# Patient Record
Sex: FEMALE | URBAN - METROPOLITAN AREA
[De-identification: names, ages, dates, MRNs, and addresses within clinical notes are randomized per-mention and may not be internally consistent; named-entity substitution may affect disease eponyms.]

---

## 2017-07-30 ENCOUNTER — APPOINTMENT (OUTPATIENT)
Dept: GENERAL RADIOLOGY | Facility: CLINIC | Age: 47
End: 2017-07-30
Attending: EMERGENCY MEDICINE
Payer: COMMERCIAL

## 2017-07-30 ENCOUNTER — HOSPITAL ENCOUNTER (EMERGENCY)
Facility: CLINIC | Age: 47
Discharge: HOME | End: 2017-07-30
Attending: EMERGENCY MEDICINE | Admitting: EMERGENCY MEDICINE
Payer: COMMERCIAL

## 2017-07-30 VITALS
OXYGEN SATURATION: 100 % | DIASTOLIC BLOOD PRESSURE: 69 MMHG | TEMPERATURE: 97.9 F | RESPIRATION RATE: 11 BRPM | BODY MASS INDEX: 24.84 KG/M2 | SYSTOLIC BLOOD PRESSURE: 111 MMHG | WEIGHT: 145.5 LBS | HEIGHT: 64 IN | HEART RATE: 52 BPM

## 2017-07-30 DIAGNOSIS — S43.004A DISLOCATION OF SHOULDER REGION, RIGHT, INITIAL ENCOUNTER: ICD-10-CM

## 2017-07-30 PROCEDURE — 99285 EMERGENCY DEPT VISIT HI MDM: CPT | Mod: 25

## 2017-07-30 PROCEDURE — 73030 X-RAY EXAM OF SHOULDER: CPT | Mod: RT

## 2017-07-30 PROCEDURE — 25000128 H RX IP 250 OP 636: Performed by: EMERGENCY MEDICINE

## 2017-07-30 PROCEDURE — 25000128 H RX IP 250 OP 636

## 2017-07-30 PROCEDURE — 23650 CLTX SHO DSLC W/MNPJ WO ANES: CPT | Mod: RT

## 2017-07-30 PROCEDURE — 96376 TX/PRO/DX INJ SAME DRUG ADON: CPT

## 2017-07-30 PROCEDURE — 99152 MOD SED SAME PHYS/QHP 5/>YRS: CPT

## 2017-07-30 PROCEDURE — 96374 THER/PROPH/DIAG INJ IV PUSH: CPT | Mod: 59

## 2017-07-30 RX ORDER — HYDROMORPHONE HYDROCHLORIDE 1 MG/ML
0.5 INJECTION, SOLUTION INTRAMUSCULAR; INTRAVENOUS; SUBCUTANEOUS ONCE
Status: COMPLETED | OUTPATIENT
Start: 2017-07-30 | End: 2017-07-30

## 2017-07-30 RX ORDER — HYDROMORPHONE HYDROCHLORIDE 1 MG/ML
INJECTION, SOLUTION INTRAMUSCULAR; INTRAVENOUS; SUBCUTANEOUS
Status: DISCONTINUED
Start: 2017-07-30 | End: 2017-07-30 | Stop reason: HOSPADM

## 2017-07-30 RX ORDER — HYDROMORPHONE HYDROCHLORIDE 1 MG/ML
0.5 INJECTION, SOLUTION INTRAMUSCULAR; INTRAVENOUS; SUBCUTANEOUS
Status: COMPLETED | OUTPATIENT
Start: 2017-07-30 | End: 2017-07-30

## 2017-07-30 RX ORDER — PROPOFOL 10 MG/ML
INJECTION, EMULSION INTRAVENOUS
Status: DISCONTINUED
Start: 2017-07-30 | End: 2017-07-30 | Stop reason: HOSPADM

## 2017-07-30 RX ADMIN — HYDROMORPHONE HYDROCHLORIDE 0.5 MG: 1 INJECTION, SOLUTION INTRAMUSCULAR; INTRAVENOUS; SUBCUTANEOUS at 08:26

## 2017-07-30 RX ADMIN — HYDROMORPHONE HYDROCHLORIDE 0.5 MG: 1 INJECTION, SOLUTION INTRAMUSCULAR; INTRAVENOUS; SUBCUTANEOUS at 07:20

## 2017-07-30 ASSESSMENT — ENCOUNTER SYMPTOMS
WEAKNESS: 0
NUMBNESS: 0
WOUND: 0
ARTHRALGIAS: 1

## 2017-07-30 NOTE — ED NOTES
Bed: ED03  Expected date: 7/30/17  Expected time:   Means of arrival:   Comments:  mateo 2 46 f/shoulder pain

## 2017-07-30 NOTE — ED AVS SNAPSHOT
Emergency Department    [address] [place] [address]    Phone:  [phone]    Fax:  [phone]                                       Graciela Martin   MRN: [id number]    Department:   Emergency Department   Date of Visit:  7/30/2017           After Visit Summary Signature Page     I have received my discharge instructions, and my questions have been answered. I have discussed any challenges I see with this plan with the nurse or doctor.    ..........................................................................................................................................  Patient/Patient Representative Signature      ..........................................................................................................................................  Patient Representative Print Name and Relationship to Patient    ..................................................               ................................................  Date                                            Time    ..........................................................................................................................................  Reviewed by Signature/Title    ...................................................              ..............................................  Date                                                            Time

## 2017-07-30 NOTE — DISCHARGE INSTRUCTIONS
Dislocation: Shoulder (Reduced)    A shoulder is dislocated when a strong force injures, and possibly tears the ligaments that hold the shoulder joint together. The bones that make up the joint then move apart and become stuck out of place. The joint must be put back in place. Then it will take several weeks for the shoulder to heal. This injury may weaken the ligaments. Weakened ligaments put you at risk for another dislocation. Another dislocation can happen even if you are hit with less force.  Shoulder dislocation is treated with a special type of arm sling called a shoulder immobilizer. This keeps your arm close to your body. This stops the shoulder from dislocating again while the ligaments heal. After a few weeks, you may start an exercise program. This will gradually bring back your range-of-motion and shoulder strength. It will also lower your risk for another dislocation.  Home care  Follow these tips for taking care of yourself at home:    Until your next doctor visit, wear your shoulder immobilizer at all times. Don t take it off at night to sleep. This is because it s possible to dislocate your arm again in your sleep. You can take it off to bathe or dress. But don t move your arm away from your body. Keep your arm in the same position that the sling was holding it in until you put the sling back on. During your next visit, ask your doctor how long you should wear the sling.    Apply an ice pack over the injured area for 20 minutes every 1 to 2 hours the first day. You can make an ice pack by putting ice cubes in a plastic bag. Wrap the bag in a towel before putting it on your shoulder. Continue with ice packs 3 to 4 times a day for the next 2 to 3 days. Then use the ice as needed to relieve pain and swelling.    You may use acetaminophen or ibuprofen to control pain, unless another pain medicine was prescribed. If you have chronic liver or kidney disease or ever had a stomach ulcer or  gastrointestinal bleeding, talk with your doctor before using these medicines.    Don t take part in sports or physical education classes until your doctor says it s OK.  Follow-up care  Follow up with your healthcare provider, or as advised. You shouldn t wear your shoulder immobilizer or sling for more than a few weeks without taking it off. Keeping it on for a longer time may limit your range-of-motion at the shoulder joint. If you have had several dislocations of the same shoulder, you may have permanent damage to the ligaments. Ask an orthopedic doctor about surgery to prevent another dislocation.  When to seek medical advice  Call your healthcare provider right away if any of these occur:    Another dislocation of your shoulder    Swelling or pain in the shoulder or arm that gets worse    Your fingers become cold, blue, numb or tingly    Fever or chills  Date Last Reviewed: 8/2/2016 2000-2017 [employer]. [address] [place] [address]. All rights reserved. This information is not intended as a substitute for professional medical care. Always follow your healthcare professional's instructions.

## 2017-07-30 NOTE — ED AVS SNAPSHOT
Emergency Department    [address] [facility] [address]    Phone:  [phone]    Fax:  [phone]                                       Graciela Martin   MRN: [id number]    Department:   Emergency Department   Date of Visit:  7/30/2017           Patient Information     Date Of Birth          1970        Your diagnoses for this visit were:     Dislocation of shoulder region, right, initial encounter        You were seen by Henny Herrera MD.      Follow-up Information     Follow up with Orthopaedics, [facility]. Schedule an appointment as soon as possible for a visit in 3 days.    Contact information:    [address] [address]  [phone]          Follow up with  Emergency Department.    Specialty:  EMERGENCY MEDICINE    Why:  As needed, If symptoms worsen    Contact information:    [address] [facility] [id number] [phone]        Discharge Instructions         Dislocation: Shoulder (Reduced)    A shoulder is dislocated when a strong force injures, and possibly tears the ligaments that hold the shoulder joint together. The bones that make up the joint then move apart and become stuck out of place. The joint must be put back in place. Then it will take several weeks for the shoulder to heal. This injury may weaken the ligaments. Weakened ligaments put you at risk for another dislocation. Another dislocation can happen even if you are hit with less force.  Shoulder dislocation is treated with a special type of arm sling called a shoulder immobilizer. This keeps your arm close to your body. This stops the shoulder from dislocating again while the ligaments heal. After a few weeks, you may start an exercise program. This will gradually bring back your range-of-motion and shoulder strength. It will also lower your risk for another dislocation.  Home care  Follow these tips for taking care of yourself at home:    Until your next doctor visit,  wear your shoulder immobilizer at all times. Don t take it off at night to sleep. This is because it s possible to dislocate your arm again in your sleep. You can take it off to bathe or dress. But don t move your arm away from your body. Keep your arm in the same position that the sling was holding it in until you put the sling back on. During your next visit, ask your doctor how long you should wear the sling.    Apply an ice pack over the injured area for 20 minutes every 1 to 2 hours the first day. You can make an ice pack by putting ice cubes in a plastic bag. Wrap the bag in a towel before putting it on your shoulder. Continue with ice packs 3 to 4 times a day for the next 2 to 3 days. Then use the ice as needed to relieve pain and swelling.    You may use acetaminophen or ibuprofen to control pain, unless another pain medicine was prescribed. If you have chronic liver or kidney disease or ever had a stomach ulcer or gastrointestinal bleeding, talk with your doctor before using these medicines.    Don t take part in sports or physical education classes until your doctor says it s OK.  Follow-up care  Follow up with your healthcare provider, or as advised. You shouldn t wear your shoulder immobilizer or sling for more than a few weeks without taking it off. Keeping it on for a longer time may limit your range-of-motion at the shoulder joint. If you have had several dislocations of the same shoulder, you may have permanent damage to the ligaments. Ask an orthopedic doctor about surgery to prevent another dislocation.  When to seek medical advice  Call your healthcare provider right away if any of these occur:    Another dislocation of your shoulder    Swelling or pain in the shoulder or arm that gets worse    Your fingers become cold, blue, numb or tingly    Fever or chills  Date Last Reviewed: 8/2/2016 2000-2017 [employer]. [address] [place] [address], [place] [address]. All rights reserved. This  information is not intended as a substitute for professional medical care. Always follow your healthcare professional's instructions.          24 Hour Appointment Hotline       To make an appointment at any [facility], call [phone] ([phone]). If you don't have a family doctor or clinic, we will help you find one. [facility] are conveniently located to serve the needs of you and your family.             Review of your medicines      Notice     You have not been prescribed any medications.            Procedures and tests performed during your visit     XR Shoulder Right Port G/E 2 Views      Orders Needing Specimen Collection     None      Pending Results     No orders found from 7/28/2017 to 7/31/2017.            Pending Culture Results     No orders found from 7/28/2017 to 7/31/2017.            Pending Results Instructions     If you had any lab results that were not finalized at the time of your Discharge, you can call the ED Lab Result RN at [phone]. You will be contacted by this team for any positive Lab results or changes in treatment. The nurses are available 7 days a week from 10A to 6:30P.  You can leave a message 24 hours per day and they will return your call.        Test Results From Your Hospital Stay              7/30/2017  8:33 AM      Narrative     XR SHOULDER RT PORT G/E 2 VW   7/30/2017 8:26 AM     HISTORY: Pain.    COMPARISON: None.        Impression     IMPRESSION: Normal.    JENNIFFER HDZ MD                Clinical Quality Measure: Blood Pressure Screening     Your blood pressure was checked while you were in the emergency department today. The last reading we obtained was  BP: 102/56 . Please read the guidelines below about what these numbers mean and what you should do about them.  If your systolic blood pressure (the top number) is less than 120 and your diastolic blood pressure (the bottom number) is less than 80, then your blood pressure is normal. There is  "nothing more that you need to do about it.  If your systolic blood pressure (the top number) is 120-139 or your diastolic blood pressure (the bottom number) is 80-89, your blood pressure may be higher than it should be. You should have your blood pressure rechecked within a year by a primary care provider.  If your systolic blood pressure (the top number) is 140 or greater or your diastolic blood pressure (the bottom number) is 90 or greater, you may have high blood pressure. High blood pressure is treatable, but if left untreated over time it can put you at risk for heart attack, stroke, or kidney failure. You should have your blood pressure rechecked by a primary care provider within the next 4 weeks.  If your provider in the emergency department today gave you specific instructions to follow-up with your doctor or provider even sooner than that, you should follow that instruction and not wait for up to 4 weeks for your follow-up visit.        Thank you for choosing [facility]       Thank you for choosing [facility] for your care. Our goal is always to provide you with excellent care. Hearing back from our patients is one way we can continue to improve our services. Please take a few minutes to complete the written survey that you may receive in the mail after you visit with us. Thank you!        [employer] Information     [facility] lets you send messages to your doctor, view your test results, renew your prescriptions, schedule appointments and more. To sign up, go to [email or web address] . Click on \"Log in\" on the left side of the screen, which will take you to the Welcome page. Then click on \"Sign up Now\" on the right side of the page.     You will be asked to enter the access code listed below, as well as some personal information. Please follow the directions to create your username and password.     Your access code is: [email or web address]  Expires: 10/28/2017  9:57 AM     Your access code will  in 90 days. If you " need help or a new code, please call your [facility] clinic or [phone].        Care EveryWhere ID     This is your Care EveryWhere ID. This could be used by other organizations to access your [facility] medical records  [id number]        Equal Access to Services     YAS MALONE : Casey Choe, home boboadaha, qamickey kaalmaphong justice, malik welch. So [facility] [phone].    ATENCIÓN: Si habla español, tiene a salas disposición servicios gratuitos de asistencia lingüística. Llame al [phone].    We comply with applicable federal civil rights laws and [place] laws. We do not discriminate on the basis of race, color, national origin, age, disability sex, sexual orientation or gender identity.            After Visit Summary       This is your record. Keep this with you and show to your community pharmacist(s) and doctor(s) at your next visit.

## 2017-07-30 NOTE — ED PROVIDER NOTES
"  History     Chief Complaint:  Shoulder pain    HPI   Graciela Martin is a 46 year old female who presents with shoulder pain. The patient states she slipped on the spiraling steps all the way to the bottom on her back. She did not hit her head or have loss of consciousness. She thinks that she have dislocated her right shoulder as she have dislocated her right shoulder 4 times previously. Here, the patient is tearful, sitting in bed and holding her right shoulder with her left hand in pain, but denies numbness, weakness, or any additional injuries.     Allergies:  No known drug allergies     Medications:    The patient is currently on no regular medications.     Past Medical History:    The patient does not have any past pertinent medical history.     Past Surgical History:    History reviewed. No pertinent surgical history.     Family History:    History reviewed. No pertinent family history.      Social History:  Smoking status: Never smoker    Review of Systems   Musculoskeletal: Positive for arthralgias.   Skin: Negative for wound.   Neurological: Negative for syncope, weakness and numbness.   10 point review of systems performed and is negative except as above and in HPI.     Physical Exam   First Vitals:  BP: 105/53  Pulse: 63  Heart Rate: 63  Temp: 97.9  F (36.6  C)  Resp: 16  Height: 162.6 cm (5' 4\")  Weight: 66 kg (145 lb 8.1 oz)  SpO2: 99 %      Physical Exam  General: Resting on the gurney, appears very uncomfortable  Head:  The scalp, face, and head appear normal  Mouth/Throat: Mucus membranes are moist  CV:  Regular rate    Normal S1 and S2  No pathological murmur   Resp:  Breath sounds clear and equal bilaterally    Non-labored, no retractions or accessory muscle use    No coarseness    No wheezing   GI:  Abdomen is soft, no rigidity    No tenderness to palpation  MS:  Good capillary refill noted.    Right shoulder with obvious deformity consistent with anterior dislocation.   Skin:  No rash or " lesions noted.  Neuro:  Speech is normal and fluent. No apparent deficit. Sensation intact in the hand and fingers. The patient is able to give a thumb's up and ok sign and touch her thumb to pinky. She is able to flex and extend fingers and abduct and adduct fingers.  Brisk capillary refill in the hand.   Psych: Awake. Alert.  Normal affect.      Appropriate interactions.     Emergency Department Course   Imaging:  Radiographic findings were communicated with the patient who voiced understanding of the findings.    X-ray shoulder right, 2 views post reduction:  Normal  Result per radiology.      Procedures:      Procedure: Sedation       Expected Level:  Minimal Sedation      Indication:  Sedation is required to allow for joint reduction    Consent:  Risks (including but not limited to: respiratory depression, respiratory failure, or death), benefits and alternatives were discussed with    patient and consent for procedure was obtained.       Timeout: Universal protocol was followed.  TIME OUT conducted prior to     Starting procedure confirmed patient identity, site/side, procedure, patient    Position, and availability of correct equipment and implants?     Yes    PREASSESSMENT TIME: 0756      PO Intake:  Clear Liquids >2 hours      ASA Class:  Class 1 - HEALTHY PATIENT      Mallampati:  Grade 3:  Soft palate visible, posterior pharyngeal wall not visible      Lungs: Clear       Heart: Clear      Medication:  Propofol      Monitoring:  Monitoring consisted of:  heart rate, cardiac monitor, continuous pulse oximetry, continuous capnometry, frequent blood pressure checks, level of consciousness, IV access, constant attendance by RN until patient recovered, constant attendance by MD until patient stable and intubation and emergency airway equipment available      Patient tolerance: Patient tolerated the procedure well with no immediate complications, Vital signs stable, Airway patent, O2 Sats > 92%.      Patient  Status:  Post procedure patient was sleepy.   Patient was monitored during recovery and returned to pre-procedure baseline.    TOTAL PHYSICIAN DRUG ADMINISTRATION/MONITORING TIME: 3 minutes        Narrative: Procedure: Reduction       Location: Right shoulder     Consent:  Risks, benefits and alternatives were discussed with patient and consent for procedure was obtained.     Timeout:  Universal protocol was followed. TIME OUT conducted just prior to starting procedure confirmed patient identity, site/side, procedure, patient position, and availability of correct equipment and implants? Yes      Medication:  Propofol: IV    Procedure Note:  Patient positioned in supine position.  Procedural anesthesia was utilized, see above.  The arm was grasped and with gentle longitudinal traction with scapular manipulation the humeral head was easily reduced back into the glenoid fossa.  The neurovascular exam was normal before and after reduction attempt.      Patient Status:  Patient tolerated the procedure well.  There were no complications.       Interventions:  0720 Dilaudid 0.5 mg IV    0759 Propofol 40 mg IV   0801 Propofol 20 mg IV   0826 Dilaudid 0.5mg IV    Emergency Department Course:  Past medical records, nursing notes, and vitals reviewed.  0728: I performed an exam of the patient and obtained history, as documented above.   Intervention given as above.  Procedure performed as above.   The patient was sent for an X-ray while in the emergency department, findings above.   0853: I rechecked the patient. Findings and plan explained to the Patient. Patient discharged home with instructions regarding supportive care, medications, and reasons to return. The importance of close follow-up was reviewed.      Impression & Plan    Medical Decision Making:  Graciela Martin is a 46 year old female who presents for evaluation of shoulder pain after falling down spiraling stairs on her back. This is consistent by clinical and  radiological exam with a shoulder dislocation. The dislocation was successfully reduced and a shoulder immobilizer was placed by myself, the fit was checked.  The neurovascular exam is normal pre and post-reduction.  I will have patient stay in the immobilizer until follow-up with orthopedics in 3-5 days.  The head to toe trauma exam is otherwise normal and I doubt serious underlying spinal, intracerebral, chest, abdominal, pelvic or extremity trauma.      Diagnosis:    ICD-10-CM    1. Dislocation of shoulder region, right, initial encounter S43.004A        Disposition:  discharged to home    Kyle Malagon  7/30/2017    EMERGENCY DEPARTMENT  I, Kyle Malagon, am serving as a scribe at 7:28 AM on 7/30/2017 to document services personally performed by Henny Herrera MD based on my observations and the provider's statements to me.       Henny Herrera MD  07/31/17 9048

## 2017-10-05 ENCOUNTER — HOSPITAL ENCOUNTER (OUTPATIENT)
Dept: GENERAL RADIOLOGY | Facility: CLINIC | Age: 47
Discharge: HOME | End: 2017-10-05

## 2017-10-05 DIAGNOSIS — R76.11 POSITIVE PPD: ICD-10-CM

## 2017-10-05 PROCEDURE — 40000293 XR CHEST 1 VIEW, EMPLOYEE HEALTH

## 2018-02-08 ENCOUNTER — OFFICE VISIT (OUTPATIENT)
Dept: FAMILY MEDICINE | Facility: CLINIC | Age: 48
End: 2018-02-08
Payer: COMMERCIAL

## 2018-02-08 VITALS — TEMPERATURE: 97.2 F | DIASTOLIC BLOOD PRESSURE: 60 MMHG | SYSTOLIC BLOOD PRESSURE: 95 MMHG

## 2018-02-08 DIAGNOSIS — Z71.84 TRAVEL ADVICE ENCOUNTER: Primary | ICD-10-CM

## 2018-02-08 DIAGNOSIS — Z23 NEED FOR VACCINATION: ICD-10-CM

## 2018-02-08 PROCEDURE — 90471 IMMUNIZATION ADMIN: CPT | Mod: GA | Performed by: NURSE PRACTITIONER

## 2018-02-08 PROCEDURE — 86480 TB TEST CELL IMMUN MEASURE: CPT | Mod: GA | Performed by: NURSE PRACTITIONER

## 2018-02-08 PROCEDURE — 90472 IMMUNIZATION ADMIN EACH ADD: CPT | Mod: GA | Performed by: NURSE PRACTITIONER

## 2018-02-08 PROCEDURE — 36415 COLL VENOUS BLD VENIPUNCTURE: CPT | Mod: GA | Performed by: NURSE PRACTITIONER

## 2018-02-08 PROCEDURE — 90717 YELLOW FEVER VACCINE SUBQ: CPT | Mod: GA | Performed by: NURSE PRACTITIONER

## 2018-02-08 PROCEDURE — 90632 HEPA VACCINE ADULT IM: CPT | Mod: GA | Performed by: NURSE PRACTITIONER

## 2018-02-08 PROCEDURE — 99403 PREV MED CNSL INDIV APPRX 45: CPT | Mod: 25 | Performed by: NURSE PRACTITIONER

## 2018-02-08 RX ORDER — ATOVAQUONE AND PROGUANIL HYDROCHLORIDE 250; 100 MG/1; MG/1
1 TABLET, FILM COATED ORAL DAILY
Qty: 17 TABLET | Refills: 0 | Status: SHIPPED | OUTPATIENT
Start: 2018-02-08

## 2018-02-08 RX ORDER — CIPROFLOXACIN 500 MG/1
500 TABLET, FILM COATED ORAL 2 TIMES DAILY
Qty: 6 TABLET | Refills: 0 | Status: SHIPPED | OUTPATIENT
Start: 2018-02-08

## 2018-02-08 RX ORDER — ESTRADIOL 0.03 MG/D
FILM, EXTENDED RELEASE TRANSDERMAL
Refills: 0 | COMMUNITY
Start: 2018-01-22

## 2018-02-08 RX ORDER — AZITHROMYCIN 500 MG/1
500 TABLET, FILM COATED ORAL DAILY
Qty: 3 TABLET | Refills: 0 | Status: SHIPPED | OUTPATIENT
Start: 2018-02-08 | End: 2018-02-11

## 2018-02-08 NOTE — PATIENT INSTRUCTIONS
Today February 8, 2018 you received the    Hepatitis A Vaccine - Please return on 8/7/18 or later for your 2nd and final dose.    Yellow Fever (YF)        Typhoid - Oral. This prescription has been faxed to your pharmacy, please take as directed.     FUTURE:  Tdap and  Meningitis  Menactra  .    These appointments can be made as a NURSE ONLY visit.    **It is very important for the vaccinations to be given on the scheduled day(s), this helps ensure you receive the full effectiveness of the vaccine.**    Please call [facility] with any questions [phone]    Thank you for visiting [facility]

## 2018-02-08 NOTE — MR AVS SNAPSHOT
After Visit Summary   2/8/2018    Graciela Martin    MRN: [id number]           Patient Information     Date Of Birth          1970        Visit Information        Provider Department      2/8/2018 10:15 AM Kavitha Barboza APRN CNP [facility]        Today's Diagnoses     Travel advice encounter    -  1    Need for vaccination          Care Instructions    Today February 8, 2018 you received the    Hepatitis A Vaccine - Please return on 8/7/18 or later for your 2nd and final dose.    Yellow Fever (YF)        Typhoid - Oral. This prescription has been faxed to your pharmacy, please take as directed.     FUTURE:  Tdap and  Meningitis  Menactra  .    These appointments can be made as a NURSE ONLY visit.    **It is very important for the vaccinations to be given on the scheduled day(s), this helps ensure you receive the full effectiveness of the vaccine.**    Please call [facility] with any questions [phone]    Thank you for visiting [facility]              Follow-ups after your visit        Future tests that were ordered for you today     Open Future Orders        Priority Expected Expires Ordered    TDAP VACCINE (ADACEL) Routine  2/8/2019 2/8/2018    MENINGOCOCCAL VACCINE,IM (MENACTRA) Routine  2/8/2019 2/8/2018            Who to contact     If you have questions or need follow up information about today's clinic visit or your schedule please contact [facility] directly at [phone].  Normal or non-critical lab and imaging results will be communicated to you by MyChart, letter or phone within 4 business days after the clinic has received the results. If you do not hear from us within 7 days, please contact the clinic through MyChart or phone. If you have a critical or abnormal lab result, we will notify you by phone as soon as possible.  Submit refill requests through [email or web address] or call your pharmacy and they will forward the  "refill request to us. Please allow 3 business days for your refill to be completed.          Additional Information About Your Visit        MyChart Information     [facility] lets you send messages to your doctor, view your test results, renew your prescriptions, schedule appointments and more. To sign up, go to [email or web address] . Click on \"Log in\" on the left side of the screen, which will take you to the Welcome page. Then click on \"Sign up Now\" on the right side of the page.     You will be asked to enter the access code listed below, as well as some personal information. Please follow the directions to create your username and password.     Your access code is: [email or web address]  Expires: 2018 11:11 AM     Your access code will  in 90 days. If you need help or a new code, please call your [facility] clinic or [phone].        Care EveryWhere ID     This is your Care EveryWhere ID. This could be used by other organizations to access your [facility] medical records  [id number]        Your Vitals Were     Temperature Last Period Breastfeeding?             97.2  F (36.2  C) (Oral) 2018 No          Blood Pressure from Last 3 Encounters:   18 95/60   17 111/69    Weight from Last 3 Encounters:   17 145 lb 8.1 oz (66 kg)              We Performed the Following     C YELLOW FEVER IMMUNIZATION, LIVE, SQ (STAMARIL)     HEPA VACCINE ADULT IM     M Tuberculosis by Quantiferon          Today's Medication Changes          These changes are accurate as of 18 11:17 AM.  If you have any questions, ask your nurse or doctor.               Start taking these medicines.        Dose/Directions    atovaquone-proguanil 250-100 MG per tablet   Commonly known as:  MALARONE   Used for:  Need for vaccination, Travel advice encounter   Started by:  Kavitha Barboza APRN CNP        Dose:  1 tablet   Take 1 tablet by mouth daily Start 2 days before exposure to Malaria and continue daily till  7 days " after exposure.   Quantity:  17 tablet   Refills:  0       azithromycin 500 MG tablet   Commonly known as:  ZITHROMAX   Used for:  Need for vaccination, Travel advice encounter   Started by:  Kavitha Barboza APRN CNP        Dose:  500 mg   Take 1 tablet (500 mg) by mouth daily for 3 doses Take 1 tablet a day for up to 3 days for severe diarrhea   Quantity:  3 tablet   Refills:  0       ciprofloxacin 500 MG tablet   Commonly known as:  CIPRO   Used for:  Travel advice encounter   Started by:  Kavitha Barboza APRN CNP        Dose:  500 mg   Take 1 tablet (500 mg) by mouth 2 times daily Take 1 tablet two times a day for up to 3 days for severe diarrhea during travel.   Quantity:  6 tablet   Refills:  0       typhoid CR capsule   Commonly known as:  VIVOTIF   Used for:  Need for vaccination, Travel advice encounter   Started by:  Kavitha Barboza APRN CNP        Dose:  1 capsule   Take 1 capsule by mouth every other day   Quantity:  4 capsule   Refills:  0            Where to get your medicines      These medications were sent to [facility] [address] [place] [address], [place] [address] [address] [facility]  [address] [place] [address]     Phone:  [phone]     atovaquone-proguanil 250-100 MG per tablet    azithromycin 500 MG tablet    ciprofloxacin 500 MG tablet    typhoid CR capsule                Primary Care Provider Fax #    Physician No Ref-Primary [phone]       No address on file        Equal Access to Services     [facility]: Hadii crystal tano Sosegundo, waaxda luqadaha, qaybta kaalmada adeegyada, malik quintana . So [facility] [phone].    ATENCIÓN: Si habla español, tiene a salas disposición servicios gratuitos de asistencia lingüística. Tabatha al [phone].    We comply with applicable federal civil rights laws and [place] laws. We do not discriminate on the basis of race, color, national origin, age, disability, sex, sexual orientation, or  gender identity.            Thank you!     Thank you for choosing [facility]  for your care. Our goal is always to provide you with excellent care. Hearing back from our patients is one way we can continue to improve our services. Please take a few minutes to complete the written survey that you may receive in the mail after your visit with us. Thank you!             Your Updated Medication List - Protect others around you: Learn how to safely use, store and throw away your medicines at www.disposemymeds.org.          This list is accurate as of 2/8/18 11:17 AM.  Always use your most recent med list.                   Brand Name Dispense Instructions for use Diagnosis    atovaquone-proguanil 250-100 MG per tablet    MALARONE    17 tablet    Take 1 tablet by mouth daily Start 2 days before exposure to Malaria and continue daily till  7 days after exposure.    Need for vaccination, Travel advice encounter       azithromycin 500 MG tablet    ZITHROMAX    3 tablet    Take 1 tablet (500 mg) by mouth daily for 3 doses Take 1 tablet a day for up to 3 days for severe diarrhea    Need for vaccination, Travel advice encounter       ciprofloxacin 500 MG tablet    CIPRO    6 tablet    Take 1 tablet (500 mg) by mouth 2 times daily Take 1 tablet two times a day for up to 3 days for severe diarrhea during travel.    Travel advice encounter       estradiol 0.025 MG/24HR BIW patch    VIVELLE-DOT     MATT 1 PA EXT TO THE SKIN 2 TIMES A WK        typhoid CR capsule    VIVOTIF    4 capsule    Take 1 capsule by mouth every other day    Need for vaccination, Travel advice encounter

## 2018-02-08 NOTE — PROGRESS NOTES
Nurse Note      Itinerary:  [facility]      Departure Date: 03/09/2018      Return Date: 03/06/2018      Length of Trip 1 week      Reason for Travel: Business           Urban or rural: Urban      Accommodations: Hotel        IMMUNIZATION HISTORY  Have you received any immunizations within the past 4 weeks?  No  Have you ever fainted from having your blood drawn or from an injection?  No  Have you ever had a fever reaction to vaccination?  No  Have you ever had any bad reaction or side effect from any vaccination?  No  Have you ever had hepatitis A or B vaccine?  Yes  Do you live (or work closely) with anyone who has AIDS, an AIDS-like condition, any other immune disorder or who is on chemotherapy for cancer?  No  Do you have a family history of immunodeficiency?  No  Have you received any injection of immune globulin or any blood products during the past 12 months?  No    Patient roomed by THOMAS Medina  Graciela Martin is a 47 year old female seen today alone for counsultation for international travel to [facility] for Business.  Patient will be departing in  1 month(s) and staying for   1 week(s) and  traveling with co-worker(s).      Patient itinerary :  will be in the urban region of [facility] which presents risk for Malaria, Yellow Fever, Dengue Fever, Chikungungya, Zika,  Trypanosomiasis, Schistosomiasis, Rabies, food borne illnesses, motor vehicle accidents, Typhoid and Leishmaniasis. exposure.      Patient's activities will include business.    Patient[place] birth [place]    Special medical concerns: none  Pre-travel questionnaire was completed by patient and reviewed by provider.     Vitals: BP 95/60  Temp 97.2  F (36.2  C) (Oral)  LMP 01/24/2018  Breastfeeding? No  BMI= There is no height or weight on file to calculate BMI.    EXAM:  General:  Well-nourished, well-developed in no acute distress.  Appears to be stated age, interacts appropriately and expresses understanding of  information given to patient.    Current Outpatient Prescriptions   Medication Sig Dispense Refill     estradiol (VIVELLE-DOT) 0.025 MG/24HR BIW patch MATT 1 PA EXT TO THE SKIN 2 TIMES A WK  0     typhoid (VIVOTIF) CR capsule Take 1 capsule by mouth every other day 4 capsule 0     atovaquone-proguanil (MALARONE) 250-100 MG per tablet Take 1 tablet by mouth daily Start 2 days before exposure to Malaria and continue daily till  7 days after exposure. 17 tablet 0     ciprofloxacin (CIPRO) 500 MG tablet Take 1 tablet (500 mg) by mouth 2 times daily Take 1 tablet two times a day for up to 3 days for severe diarrhea during travel. 6 tablet 0     There is no problem list on file for this patient.    No Known Allergies      Immunizations discussed include:   Hepatitis A:  Ordered/given today, risks, benefits and side effects reviewed  Hepatitis B: Up to date  Influenza: Up to date  Typhoid: Oral Typhoid (Vivotiff) Rx sent to pharmacy  Rabies: Declined  reviewed managment of a animal bite or scratch (washing wound, seek medical care within 24 hours for post exposure prophylaxis )  Yellow Fever: Stamaril Ordered/given today - consent completed, side effects, precautions, allergies, risks discussed. Patient expressed understanding.  Turkish Encephalitis: Not indicated  Meningococcus: future order placed  Tetanus/Diphtheria: future order placed  Measles/Mumps/Rubella: Up to date  Cholera: Not needed  Polio: Up to date  Pneumococcal: Under age of 65  Varicella: Immune by disease history per patient report  Zostavax:  Not indicated  HPV:  Not indicated  TB: low risk   Stamaril Informed Consent    The patient was provided with a copy of the IRB-approved consent form and all questions were answered before the patient agreed to participate by signing the informed consent document.   A copy of the form was provided to the patient.    Date: 02/08/2018  Consent Version Date: 05/10/2017  Consent Obtained by:  Jabari  HIPAA:  Yes  HIPAA  Authorization Signed Date: 02/08/2018      Inclusion/Exclusion Criteria:    (Similar to Yellow Fever-VAX)      The patient met all of the following inclusion criteria in order to be eligible for the Stamaril vaccination under this EAP (Expanded Access Investigational New Drug Program)           At increased risk for YF, including researchers, laboratory workers, vaccine production staff, and those who are traveling within 30 days to a YF-endemic region or to a country requiring proof of YF vaccination under IHRs (International Health Regulations)?       Yes     Patient is greater than or equal to 9 months of age on the day of vaccination?     Yes     Patient is greater than or equal to 18 years of age and signed and dated the Consent Forms?     Yes     Patient is < 18 years of age and parent(s)/guardian(s) signed and dated the Consent Forms?      Patient is 7 years to < 18 years of age and signed and dated the Assent form?        No Assent is required.  Patient is <7 years of age.     No      No      N/A     The patient did not meet any of the following criteria that would have excluded the patient from receiving the Stamaril vaccination under this EAP              Patient is less than 9 months of age.       No     The patient is breast-feeding and cannot stop nursing for at least 14 days after vaccination.    Note: Yellow Fever vaccine virus may be transmissible via breast milk by nursing mothers who are vaccinated during the final 2 weeks of pregnancy or post-partum.   Following transmission, infants may develop encephalitis.  The minimum time of discontinuation of breastfeeding for 14 days after vaccination is based on the expected clearance of live-attenuated vaccine virus.       No     The patient is immunosuppressed, whether congenital or idiopathic, including for example, leukemia, lymphoma, other malignancies, and patients who are receiving immunosuppressant medications (e.g. Systemic corticosteroids  [greater than the standard dose of topical or inhaled steroids], alkylating drugs, antimetabolites, of other cytotoxic or immunomodulatory drugs) or radiation therapy or organ transplantation.       No     The patient has known hypersensitivity to the active substance or to any of the excipients of Stamaril vaccine or to eggs or chicken proteins.     No     The patient is symptomatic for human immunodeficiency virus (HIV) infection     No     The patient is asymptomatic for HIV infection but accompanied by evidence of severe immune suppression    Note:  Evidence of severe immune suppression includes CD4+ T-cell counts < 200 cubic millimeters (or < 15% total lymphocytes in children aged < 6 years), or as determined by the health care provider.       No     The patient has a history of thymus dysfunction (including myasthenia gravis, thymoma, thymectomy)     No     Moderate or severe febrile illness or acute illness    Note: Participation in the EAP can be reassessed when moderate or severe febrile illness or acute illness has resolved.       No       Altitude Exposure on this trip: no  Past tolerance to Altitude: nq    ASSESSMENT/PLAN:    ICD-10-CM    1. Travel advice encounter Z71.89 C YELLOW FEVER IMMUNIZATION, LIVE, SQ (STAMARIL)     HEPA VACCINE ADULT IM     typhoid (VIVOTIF) CR capsule     atovaquone-proguanil (MALARONE) 250-100 MG per tablet     TDAP VACCINE (ADACEL)     MENINGOCOCCAL VACCINE,IM (MENACTRA)     azithromycin (ZITHROMAX) 500 MG tablet     ciprofloxacin (CIPRO) 500 MG tablet     M Tuberculosis by Quantiferon     CANCELED: TDAP VACCINE (ADACEL)   2. Need for vaccination Z23 C YELLOW FEVER IMMUNIZATION, LIVE, SQ (STAMARIL)     HEPA VACCINE ADULT IM     typhoid (VIVOTIF) CR capsule     atovaquone-proguanil (MALARONE) 250-100 MG per tablet     azithromycin (ZITHROMAX) 500 MG tablet     M Tuberculosis by Quantiferon     CANCELED: TDAP VACCINE (ADACEL)     I have reviewed general recommendations for safe  travel   including: food/water precautions, insect precautions, safer sex   practices given high prevalence of Zika, HIV and other STDs,   roadway safety. Educational materials and Travax report provided.    Malaraia prophylaxis recommended: declined  Symptomatic treatment for traveler's diarrhea: declined  Altitude illness prevention and treatment: no      Evacuation insurance advised and resources were provided to patient.    Total visit time 45 minutes  with over 50% of time spent counseling patient as detailed above.    Kavitha Barboza CNP

## 2018-02-09 LAB
M TB TUBERC IFN-G BLD QL: NEGATIVE
M TB TUBERC IFN-G/MITOGEN IGNF BLD: 0.28 IU/ML

## 2018-02-21 ENCOUNTER — ALLIED HEALTH/NURSE VISIT (OUTPATIENT)
Dept: NURSING | Facility: CLINIC | Age: 48
End: 2018-02-21
Payer: COMMERCIAL

## 2018-02-21 DIAGNOSIS — Z71.84 TRAVEL ADVICE ENCOUNTER: ICD-10-CM

## 2018-02-21 PROCEDURE — 90471 IMMUNIZATION ADMIN: CPT | Mod: GA

## 2018-02-21 PROCEDURE — 99207 ZZC NO CHARGE NURSE ONLY: CPT

## 2018-02-21 PROCEDURE — 90734 MENACWYD/MENACWYCRM VACC IM: CPT | Mod: GA

## 2018-02-21 PROCEDURE — 90715 TDAP VACCINE 7 YRS/> IM: CPT | Mod: GA

## 2018-02-21 PROCEDURE — 90472 IMMUNIZATION ADMIN EACH ADD: CPT | Mod: GA
